# Patient Record
Sex: MALE | Race: BLACK OR AFRICAN AMERICAN | NOT HISPANIC OR LATINO | Employment: PART TIME | URBAN - METROPOLITAN AREA
[De-identification: names, ages, dates, MRNs, and addresses within clinical notes are randomized per-mention and may not be internally consistent; named-entity substitution may affect disease eponyms.]

---
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
## 2020-01-03 ENCOUNTER — HOSPITAL ENCOUNTER (OUTPATIENT)
Dept: CT IMAGING | Facility: CLINIC | Age: 20
Discharge: HOME | End: 2020-01-03
Attending: INTERNAL MEDICINE | Admitting: INTERNAL MEDICINE
Payer: COMMERCIAL

## 2020-01-03 ENCOUNTER — OFFICE VISIT (OUTPATIENT)
Dept: FAMILY MEDICINE | Facility: CLINIC | Age: 20
End: 2020-01-03
Payer: COMMERCIAL

## 2020-01-03 VITALS
DIASTOLIC BLOOD PRESSURE: 73 MMHG | TEMPERATURE: 97.2 F | BODY MASS INDEX: 18.99 KG/M2 | HEIGHT: 75 IN | HEART RATE: 68 BPM | SYSTOLIC BLOOD PRESSURE: 114 MMHG | WEIGHT: 152.7 LBS | OXYGEN SATURATION: 95 %

## 2020-01-03 DIAGNOSIS — G44.219 EPISODIC TENSION-TYPE HEADACHE, NOT INTRACTABLE: ICD-10-CM

## 2020-01-03 DIAGNOSIS — K21.9 GASTROESOPHAGEAL REFLUX DISEASE, ESOPHAGITIS PRESENCE NOT SPECIFIED: ICD-10-CM

## 2020-01-03 DIAGNOSIS — G44.219 EPISODIC TENSION-TYPE HEADACHE, NOT INTRACTABLE: Primary | ICD-10-CM

## 2020-01-03 DIAGNOSIS — G43.909 MIGRAINE WITHOUT STATUS MIGRAINOSUS, NOT INTRACTABLE, UNSPECIFIED MIGRAINE TYPE: ICD-10-CM

## 2020-01-03 PROCEDURE — 99204 OFFICE O/P NEW MOD 45 MIN: CPT | Performed by: INTERNAL MEDICINE

## 2020-01-03 PROCEDURE — 70450 CT HEAD/BRAIN W/O DYE: CPT

## 2020-01-03 RX ORDER — FAMOTIDINE 20 MG/1
20 TABLET, FILM COATED ORAL 2 TIMES DAILY
Qty: 60 TABLET | Refills: 3 | Status: SHIPPED | OUTPATIENT
Start: 2020-01-03 | End: 2020-01-16

## 2020-01-03 ASSESSMENT — ANXIETY QUESTIONNAIRES
2. NOT BEING ABLE TO STOP OR CONTROL WORRYING: SEVERAL DAYS
GAD7 TOTAL SCORE: 4
3. WORRYING TOO MUCH ABOUT DIFFERENT THINGS: SEVERAL DAYS
7. FEELING AFRAID AS IF SOMETHING AWFUL MIGHT HAPPEN: SEVERAL DAYS
1. FEELING NERVOUS, ANXIOUS, OR ON EDGE: SEVERAL DAYS
6. BECOMING EASILY ANNOYED OR IRRITABLE: NOT AT ALL
IF YOU CHECKED OFF ANY PROBLEMS ON THIS QUESTIONNAIRE, HOW DIFFICULT HAVE THESE PROBLEMS MADE IT FOR YOU TO DO YOUR WORK, TAKE CARE OF THINGS AT HOME, OR GET ALONG WITH OTHER PEOPLE: NOT DIFFICULT AT ALL
5. BEING SO RESTLESS THAT IT IS HARD TO SIT STILL: NOT AT ALL

## 2020-01-03 ASSESSMENT — MIFFLIN-ST. JEOR: SCORE: 1785.33

## 2020-01-03 ASSESSMENT — PATIENT HEALTH QUESTIONNAIRE - PHQ9: 5. POOR APPETITE OR OVEREATING: NOT AT ALL

## 2020-01-03 NOTE — PROGRESS NOTES
Chief Complaint:       Chung Kolb is a 19 year old male who presents to clinic today for the following health issues:      Chief Complaint   Patient presents with     New Patient     Headache     Pt report having flu like sx 3 weeks ago and after it went away he started to have dull pain in back of head that radiates to right eye      Gastrointestinal Problem     Pt report lack of appetite x 20 months, states that have been loosing weight in recent month.         Headaches Pain    Duration:     Since: 3 weeks ago           Specific cause: none    Description:      Location of pain: diffuse headaches     Character of pain: dull     Pain radiation: none    Intensity: moderate    History:      Pain interferes with job: yes     History of similar pain problems: no     Any previous MRI or X-rays: none     Therapies tried without relief: OTC pain medications    Alleviating factors:      Improved by: none    Precipitating factors:    Worsened by: none    Accompanying Signs & Symptoms: none            Current Medications:     Current Outpatient Medications   Medication Sig Dispense Refill     acetaminophen (TYLENOL) 325 MG tablet Take 325-650 mg by mouth every 6 hours as needed for mild pain       amoxicillin (AMOXIL) 500 MG capsule Take 1 capsule (500 mg) by mouth 3 times daily (Patient not taking: Reported on 3/6/2019) 30 capsule 0     hyoscyamine SL (LEVSIN/SL) 0.125 MG sublingual tablet Take 1 tablet (0.125 mg) by mouth 4 times daily (before meals and nightly) 20 tablet 0     ibuprofen (ADVIL,MOTRIN) 200 MG tablet Take 200 mg by mouth every 4 hours as needed for mild pain       ibuprofen (ADVIL,MOTRIN) 600 MG tablet Take 1 tablet (600 mg) by mouth every 6 hours as needed for moderate pain (Patient not taking: Reported on 3/6/2019) 30 tablet 1     ibuprofen (ADVIL/MOTRIN) 800 MG tablet Take 1 tablet (800 mg) by mouth every 8 hours as needed 60 tablet 0         Allergies:    No Known Allergies         Past Medical  History:     Past Medical History:   Diagnosis Date     GERD (gastroesophageal reflux disease)          Past Surgical History:   No past surgical history on file.      Family Medical History:     Family History   Problem Relation Age of Onset     Helicobacter Pylori Mother          Social History:     Social History     Socioeconomic History     Marital status: Single     Spouse name: Not on file     Number of children: Not on file     Years of education: Not on file     Highest education level: Not on file   Occupational History     Not on file   Social Needs     Financial resource strain: Not on file     Food insecurity:     Worry: Not on file     Inability: Not on file     Transportation needs:     Medical: Not on file     Non-medical: Not on file   Tobacco Use     Smoking status: Never Smoker     Smokeless tobacco: Never Used     Tobacco comment: non smoking home   Substance and Sexual Activity     Alcohol use: Not on file     Drug use: Not on file     Sexual activity: Not on file   Lifestyle     Physical activity:     Days per week: Not on file     Minutes per session: Not on file     Stress: Not on file   Relationships     Social connections:     Talks on phone: Not on file     Gets together: Not on file     Attends Protestant service: Not on file     Active member of club or organization: Not on file     Attends meetings of clubs or organizations: Not on file     Relationship status: Not on file     Intimate partner violence:     Fear of current or ex partner: Not on file     Emotionally abused: Not on file     Physically abused: Not on file     Forced sexual activity: Not on file   Other Topics Concern     Not on file   Social History Narrative     Not on file           Review of System:     Constitutional: Negative for fever or chills  Skin: Negative for rashes  Ears/Nose/Throat: Negative for nasal congestion, sore throat  Respiratory: No shortness of breath, dyspnea on exertion, cough, or  "hemoptysis  Cardiovascular: Negative for chest pain  Gastrointestinal: positive for GERD  Genitourinary: Negative for dysuria, hematuria  Musculoskeletal: Negative for myalgias  Neurologic: positive for headaches  Psychiatric: Negative for depression, anxiety  Hematologic/Lymphatic/Immunologic: Negative  Endocrine: Negative  Behavioral: Negative for tobacco use       Physical Exam:   /73 (BP Location: Right arm, Patient Position: Sitting, Cuff Size: Adult Regular)   Pulse 68   Temp 97.2  F (36.2  C) (Oral)   Ht 1.892 m (6' 2.5\")   Wt 69.3 kg (152 lb 11.2 oz)   SpO2 95%   BMI 19.34 kg/m      GENERAL: alert and no distress  EYES: eyes grossly normal to inspection, and conjunctivae and sclerae normal  HENT: Normocephalic atraumatic. Nose and mouth without ulcers or lesions  NECK: supple  RESP: lungs clear to auscultation   CV: regular rate and rhythm, normal S1 S2  LYMPH: no peripheral edema   ABDOMEN: nondistended  MS: no gross musculoskeletal defects noted  SKIN: no suspicious lesions or rashes  NEURO: Alert & Oriented x 3. No focal neurological deficits present  PSYCH: mentation appears normal, affect normal        Diagnostic Test Results:     Diagnostic Test Results:  CT SCAN OF THE HEAD WITHOUT CONTRAST   1/3/2020 3:30 PM      HISTORY: Headache, acute, normal neuro exam. Episodic tension-type  headache, not intractable. Migraine without status migrainosus, not  intractable, unspecified migraine type.     TECHNIQUE:  Axial images of the head and coronal reformations without  IV contrast material. Radiation dose for this scan was reduced using  automated exposure control, adjustment of the mA and/or kV according  to patient size, or iterative reconstruction technique.     COMPARISON: None.     FINDINGS: There is no evidence of intracranial hemorrhage, mass, acute  infarct or anomaly. The ventricles are normal in size, shape and  configuration. The brain parenchyma and subarachnoid spaces are  normal. "      The visualized portions of the sinuses and mastoids appear normal. The  bony calvarium and bones of the skull base appear intact.                                                                       IMPRESSION: No acute intracranial abnormality.     MILA HERNANDEZ MD    ASSESSMENT/PLAN:     (G43.909) Migraine without status migrainosus, not intractable, unspecified migraine type  (G44.219) Episodic tension-type headache, not intractable  (primary encounter diagnosis)  Comment: acute headaches for 3 weeks of unclear etiology, maybe due to migraines.   Plan:CT Head w/o Contrast to rule out intracranial abnormalities including brain tumor is unremarkable. Patient's symptoms are most likely due to migraines. I have ordered a NEUROLOGY ADULT REFERRAL for further evaluation ,       (K21.9) Gastroesophageal reflux disease, esophagitis presence not specified  Comment: chronic GERD symptoms not well controlled concerning for H. Pylori gastritis with a positive family history of h. Pylori.  Plan: GASTROENTEROLOGY ADULT REF PROCEDURE ONLY         Rodriguez Jarrettierge [phone]; No Provider        Preference, famotidine (PEPCID) 20 MG tablet    Follow Up Plan:     Patient is instructed to return to Internal Medicine clinic for follow-up visit in 1 week.        Rima Clement MD  Internal Medicine  [facility]

## 2020-01-04 ASSESSMENT — ANXIETY QUESTIONNAIRES: GAD7 TOTAL SCORE: 4

## 2020-01-16 ENCOUNTER — OFFICE VISIT (OUTPATIENT)
Dept: FAMILY MEDICINE | Facility: CLINIC | Age: 20
End: 2020-01-16
Payer: COMMERCIAL

## 2020-01-16 VITALS
SYSTOLIC BLOOD PRESSURE: 115 MMHG | OXYGEN SATURATION: 94 % | WEIGHT: 154 LBS | TEMPERATURE: 98 F | HEART RATE: 69 BPM | HEIGHT: 75 IN | DIASTOLIC BLOOD PRESSURE: 68 MMHG | BODY MASS INDEX: 19.15 KG/M2

## 2020-01-16 DIAGNOSIS — Z00.00 ROUTINE HISTORY AND PHYSICAL EXAMINATION OF ADULT: Primary | ICD-10-CM

## 2020-01-16 DIAGNOSIS — Z13.21 ENCOUNTER FOR VITAMIN DEFICIENCY SCREENING: ICD-10-CM

## 2020-01-16 DIAGNOSIS — Z13.1 SCREENING FOR DIABETES MELLITUS: ICD-10-CM

## 2020-01-16 DIAGNOSIS — Z13.29 SCREENING FOR THYROID DISORDER: ICD-10-CM

## 2020-01-16 DIAGNOSIS — E55.9 VITAMIN D DEFICIENCY: ICD-10-CM

## 2020-01-16 DIAGNOSIS — Z13.220 LIPID SCREENING: ICD-10-CM

## 2020-01-16 LAB
BASOPHILS # BLD AUTO: 0 10E9/L (ref 0–0.2)
BASOPHILS NFR BLD AUTO: 0.5 %
DIFFERENTIAL METHOD BLD: ABNORMAL
EOSINOPHIL # BLD AUTO: 0.1 10E9/L (ref 0–0.7)
EOSINOPHIL NFR BLD AUTO: 2.2 %
ERYTHROCYTE [DISTWIDTH] IN BLOOD BY AUTOMATED COUNT: 12.6 % (ref 10–15)
FOLATE SERPL-MCNC: 18.4 NG/ML
HBA1C MFR BLD: 4.8 % (ref 0–5.6)
HCT VFR BLD AUTO: 42.1 % (ref 40–53)
HGB BLD-MCNC: 14.6 G/DL (ref 13.3–17.7)
LYMPHOCYTES # BLD AUTO: 1.8 10E9/L (ref 0.8–5.3)
LYMPHOCYTES NFR BLD AUTO: 49.5 %
MCH RBC QN AUTO: 30.2 PG (ref 26.5–33)
MCHC RBC AUTO-ENTMCNC: 34.7 G/DL (ref 31.5–36.5)
MCV RBC AUTO: 87 FL (ref 78–100)
MONOCYTES # BLD AUTO: 0.4 10E9/L (ref 0–1.3)
MONOCYTES NFR BLD AUTO: 11.3 %
NEUTROPHILS # BLD AUTO: 1.4 10E9/L (ref 1.6–8.3)
NEUTROPHILS NFR BLD AUTO: 36.5 %
PLATELET # BLD AUTO: 204 10E9/L (ref 150–450)
RBC # BLD AUTO: 4.83 10E12/L (ref 4.4–5.9)
WBC # BLD AUTO: 3.7 10E9/L (ref 4–11)

## 2020-01-16 PROCEDURE — 36415 COLL VENOUS BLD VENIPUNCTURE: CPT | Performed by: INTERNAL MEDICINE

## 2020-01-16 PROCEDURE — 82746 ASSAY OF FOLIC ACID SERUM: CPT | Performed by: INTERNAL MEDICINE

## 2020-01-16 PROCEDURE — 83036 HEMOGLOBIN GLYCOSYLATED A1C: CPT | Performed by: INTERNAL MEDICINE

## 2020-01-16 PROCEDURE — 82607 VITAMIN B-12: CPT | Performed by: INTERNAL MEDICINE

## 2020-01-16 PROCEDURE — 99395 PREV VISIT EST AGE 18-39: CPT | Performed by: INTERNAL MEDICINE

## 2020-01-16 PROCEDURE — 80061 LIPID PANEL: CPT | Performed by: INTERNAL MEDICINE

## 2020-01-16 PROCEDURE — 80050 GENERAL HEALTH PANEL: CPT | Performed by: INTERNAL MEDICINE

## 2020-01-16 PROCEDURE — 82306 VITAMIN D 25 HYDROXY: CPT | Performed by: INTERNAL MEDICINE

## 2020-01-16 RX ORDER — FAMOTIDINE 20 MG/1
20 TABLET, FILM COATED ORAL 2 TIMES DAILY
COMMUNITY
End: 2021-09-15

## 2020-01-16 ASSESSMENT — MIFFLIN-ST. JEOR: SCORE: 1786.23

## 2020-01-16 NOTE — PROGRESS NOTES
SUBJECTIVE:   CC: Chung Kolb is an 20 year old male who presents for preventative health visit.     Healthy Habits:    Getting at least 3 servings of Calcium per day:  Yes    Bi-annual eye exam:  NO    Dental care twice a year:  NO    Sleep apnea or symptoms of sleep apnea:  None    Diet:  Regular (no restrictions)    Frequency of exercise:  2-3 days/week    Duration of exercise:  45-60 minutes    Taking medications regularly:  Not Applicable    Barriers to taking medications:  Not applicable    Medication side effects:  Not applicable    PHQ-2 Total Score:    Additional concerns today:  No      Today's PHQ-2 Score:   PHQ-2 ( 1999 Pfizer) 1/3/2020   Q1: Little interest or pleasure in doing things 0   Q2: Feeling down, depressed or hopeless 0   PHQ-2 Score 0       Abuse: Current or Past(Physical, Sexual or Emotional)- No  Do you feel safe in your environment? Yes        Social History     Tobacco Use     Smoking status: Never Smoker     Smokeless tobacco: Never Used     Tobacco comment: non smoking home   Substance Use Topics     Alcohol use: Not on file     If you drink alcohol do you typically have >3 drinks per day or >7 drinks per week? No      Last PSA: No results found for: PSA    Reviewed orders with patient. Reviewed health maintenance and updated orders accordingly - Yes  Lab work is in process    Reviewed and updated as needed this visit by clinical staff         Reviewed and updated as needed this visit by Provider        Past Medical History:   Diagnosis Date     GERD (gastroesophageal reflux disease)         Review of Systems  CONSTITUTIONAL: NEGATIVE for fever, chills, change in weight  INTEGUMENTARY/SKIN: NEGATIVE for worrisome rashes, moles or lesions  EYES: NEGATIVE for vision changes or irritation  ENT: NEGATIVE for ear, mouth and throat problems  RESP: NEGATIVE for significant cough or SOB  CV: NEGATIVE for chest pain, palpitations or peripheral edema  GI: NEGATIVE for nausea, abdominal pain,  "heartburn, or change in bowel habits   male: negative for dysuria, hematuria, decreased urinary stream, erectile dysfunction, urethral discharge  MUSCULOSKELETAL: NEGATIVE for significant arthralgias or myalgia  NEURO: NEGATIVE for weakness, dizziness or paresthesias  PSYCHIATRIC: NEGATIVE for changes in mood or affect    OBJECTIVE:   /68 (BP Location: Right arm, Patient Position: Sitting, Cuff Size: Adult Regular)   Pulse 69   Temp 98  F (36.7  C) (Tympanic)   Ht 1.892 m (6' 2.5\")   Wt 69.9 kg (154 lb)   SpO2 94%   BMI 19.51 kg/m      Physical Exam  GENERAL: healthy, alert and no distress  EYES: Eyes grossly normal to inspection, PERRL and conjunctivae and sclerae normal  HENT: ear canals and TM's normal, nose and mouth without ulcers or lesions  NECK: no adenopathy, no asymmetry, masses, or scars and thyroid normal to palpation  RESP: lungs clear to auscultation - no rales, rhonchi or wheezes  CV: regular rate and rhythm, normal S1 S2, no S3 or S4, no murmur, click or rub, no peripheral edema and peripheral pulses strong  ABDOMEN: soft, nontender, no hepatosplenomegaly, no masses and bowel sounds normal  MS: no gross musculoskeletal defects noted, no edema  SKIN: no suspicious lesions or rashes  NEURO: Normal strength and tone, mentation intact and speech normal  PSYCH: mentation appears normal, affect normal/bright    Diagnostic Test Results:  Labs reviewed in Epic    ASSESSMENT/PLAN:   (Z00.00) Routine history and physical examination of adult  (primary encounter diagnosis)  Comment: yearly physical exam today  Plan: CBC with platelets and differential,         Comprehensive metabolic panel (BMP + Alb, Alk         Phos, ALT, AST, Total. Bili, TP)      (Z13.220) Lipid screening  Comment: patient is due for lipid screening  Plan: Lipid panel reflex to direct LDL Fasting      (Z13.1) Screening for diabetes mellitus  Comment: patient is due for A1c lab  Plan: Hemoglobin A1c      (Z13.29) Screening for " "thyroid disorder  Comment: patient is due for thyroid disorder screening  Plan: TSH with free T4 reflex      (E55.9) Vitamin D deficiency  Comment: patient is due for vitamin D lab  Plan: Vitamin D Deficiency      (Z13.21) Encounter for vitamin deficiency screening  Comment: patient is due for vitamin deficiency screening labs  Plan: Vitamin B12, Folate      COUNSELING:   Reviewed preventive health counseling, as reflected in patient instructions  Special attention given to:        Regular exercise       Healthy diet/nutrition    Estimated body mass index is 19.34 kg/m  as calculated from the following:    Height as of 1/3/20: 1.892 m (6' 2.5\").    Weight as of 1/3/20: 69.3 kg (152 lb 11.2 oz).          reports that he has never smoked. He has never used smokeless tobacco.      Counseling Resources:  ATP IV Guidelines  Pooled Cohorts Equation Calculator  FRAX Risk Assessment  ICSI Preventive Guidelines  Dietary Guidelines for Americans, 2010  USDA's MyPlate  ASA Prophylaxis  Lung CA Screening    Rima Clement MD  [facility]  "

## 2020-01-16 NOTE — LETTER
[address]  [address]  [place]  [address]  Tel: [phone]    January 20, 2020    Chung Rioshir  [address] [place] [address]        Dear Mr. Kolb,    The results of your recent abs Ok except for low vitamin D. New prescription for vitamin D 400 international unit(s) PO once a day sent to pharmacy.    If you have any further questions or problems, please contact our office.      Sincerely,    Giuseppe Clement MD/GILES          Enclosure: Lab Results  Results for orders placed or performed in visit on 01/16/20   CBC with platelets and differential     Status: Abnormal   Result Value Ref Range    WBC 3.7 (L) 4.0 - 11.0 10e9/L    RBC Count 4.83 4.4 - 5.9 10e12/L    Hemoglobin 14.6 13.3 - 17.7 g/dL    Hematocrit 42.1 40.0 - 53.0 %    MCV 87 78 - 100 fl    MCH 30.2 26.5 - 33.0 pg    MCHC 34.7 31.5 - 36.5 g/dL    RDW 12.6 10.0 - 15.0 %    Platelet Count 204 150 - 450 10e9/L    % Neutrophils 36.5 %    % Lymphocytes 49.5 %    % Monocytes 11.3 %    % Eosinophils 2.2 %    % Basophils 0.5 %    Absolute Neutrophil 1.4 (L) 1.6 - 8.3 10e9/L    Absolute Lymphocytes 1.8 0.8 - 5.3 10e9/L    Absolute Monocytes 0.4 0.0 - 1.3 10e9/L    Absolute Eosinophils 0.1 0.0 - 0.7 10e9/L    Absolute Basophils 0.0 0.0 - 0.2 10e9/L    Diff Method Automated Method    Lipid panel reflex to direct LDL Fasting     Status: None   Result Value Ref Range    Cholesterol 118 <200 mg/dL    Triglycerides 42 <150 mg/dL    HDL Cholesterol 45 >39 mg/dL    LDL Cholesterol Calculated 65 <100 mg/dL    Non HDL Cholesterol 73 <130 mg/dL   Comprehensive metabolic panel (BMP + Alb, Alk Phos, ALT, AST, Total. Bili, TP)     Status: None   Result Value Ref Range    Sodium 138 133 - 144 mmol/L    Potassium 3.7 3.4 - 5.3 mmol/L    Chloride 106 94 - 109 mmol/L    Carbon Dioxide 29 20 - 32 mmol/L    Anion Gap 3 3 - 14 mmol/L    Glucose 75 70 - 99 mg/dL    Urea Nitrogen 12 7 - 30 mg/dL    Creatinine 0.91 0.66 - 1.25 mg/dL    GFR Estimate  >90 >60 mL/min/[1.73_m2]    GFR Estimate If Black >90 >60 mL/min/[1.73_m2]    Calcium 8.6 8.5 - 10.1 mg/dL    Bilirubin Total 0.4 0.2 - 1.3 mg/dL    Albumin 4.3 3.4 - 5.0 g/dL    Protein Total 8.1 6.8 - 8.8 g/dL    Alkaline Phosphatase 81 40 - 150 U/L    ALT 13 0 - 70 U/L    AST 12 0 - 45 U/L   Hemoglobin A1c     Status: None   Result Value Ref Range    Hemoglobin A1C 4.8 0 - 5.6 %   TSH with free T4 reflex     Status: None   Result Value Ref Range    TSH 1.63 0.40 - 4.00 mU/L   Vitamin D Deficiency     Status: Abnormal   Result Value Ref Range    Vitamin D Deficiency screening 13 (L) 20 - 75 ug/L   Vitamin B12     Status: None   Result Value Ref Range    Vitamin B12 291 193 - 986 pg/mL   Folate     Status: None   Result Value Ref Range    Folate 18.4 >5.4 ng/mL

## 2020-01-17 LAB
ALBUMIN SERPL-MCNC: 4.3 G/DL (ref 3.4–5)
ALP SERPL-CCNC: 81 U/L (ref 40–150)
ALT SERPL W P-5'-P-CCNC: 13 U/L (ref 0–70)
ANION GAP SERPL CALCULATED.3IONS-SCNC: 3 MMOL/L (ref 3–14)
AST SERPL W P-5'-P-CCNC: 12 U/L (ref 0–45)
BILIRUB SERPL-MCNC: 0.4 MG/DL (ref 0.2–1.3)
BUN SERPL-MCNC: 12 MG/DL (ref 7–30)
CALCIUM SERPL-MCNC: 8.6 MG/DL (ref 8.5–10.1)
CHLORIDE SERPL-SCNC: 106 MMOL/L (ref 94–109)
CHOLEST SERPL-MCNC: 118 MG/DL
CO2 SERPL-SCNC: 29 MMOL/L (ref 20–32)
CREAT SERPL-MCNC: 0.91 MG/DL (ref 0.66–1.25)
GFR SERPL CREATININE-BSD FRML MDRD: >90 ML/MIN/{1.73_M2}
GLUCOSE SERPL-MCNC: 75 MG/DL (ref 70–99)
HDLC SERPL-MCNC: 45 MG/DL
LDLC SERPL CALC-MCNC: 65 MG/DL
NONHDLC SERPL-MCNC: 73 MG/DL
POTASSIUM SERPL-SCNC: 3.7 MMOL/L (ref 3.4–5.3)
PROT SERPL-MCNC: 8.1 G/DL (ref 6.8–8.8)
SODIUM SERPL-SCNC: 138 MMOL/L (ref 133–144)
TRIGL SERPL-MCNC: 42 MG/DL
TSH SERPL DL<=0.005 MIU/L-ACNC: 1.63 MU/L (ref 0.4–4)
VIT B12 SERPL-MCNC: 291 PG/ML (ref 193–986)

## 2020-01-18 LAB — DEPRECATED CALCIDIOL+CALCIFEROL SERPL-MC: 13 UG/L (ref 20–75)

## 2020-01-20 RX ORDER — SWAB
1 SWAB, NON-MEDICATED MISCELLANEOUS DAILY
Qty: 90 CAPSULE | Refills: 3 | Status: SHIPPED | OUTPATIENT
Start: 2020-01-20 | End: 2022-02-16

## 2020-01-21 ENCOUNTER — OFFICE VISIT (OUTPATIENT)
Dept: URGENT CARE | Facility: URGENT CARE | Age: 20
End: 2020-01-21
Payer: COMMERCIAL

## 2020-01-21 ENCOUNTER — ANCILLARY PROCEDURE (OUTPATIENT)
Dept: GENERAL RADIOLOGY | Facility: CLINIC | Age: 20
End: 2020-01-21
Attending: INTERNAL MEDICINE
Payer: COMMERCIAL

## 2020-01-21 VITALS
BODY MASS INDEX: 19.38 KG/M2 | OXYGEN SATURATION: 100 % | DIASTOLIC BLOOD PRESSURE: 65 MMHG | WEIGHT: 153 LBS | TEMPERATURE: 98.9 F | HEART RATE: 70 BPM | SYSTOLIC BLOOD PRESSURE: 116 MMHG

## 2020-01-21 DIAGNOSIS — R07.89 CHEST TIGHTNESS: ICD-10-CM

## 2020-01-21 DIAGNOSIS — J06.9 VIRAL URI WITH COUGH: Primary | ICD-10-CM

## 2020-01-21 DIAGNOSIS — J02.9 SORE THROAT: ICD-10-CM

## 2020-01-21 DIAGNOSIS — R07.9 CHEST PAIN, UNSPECIFIED TYPE: ICD-10-CM

## 2020-01-21 LAB
DEPRECATED S PYO AG THROAT QL EIA: NORMAL
SPECIMEN SOURCE: NORMAL

## 2020-01-21 PROCEDURE — 71046 X-RAY EXAM CHEST 2 VIEWS: CPT

## 2020-01-21 PROCEDURE — 99214 OFFICE O/P EST MOD 30 MIN: CPT | Mod: 25 | Performed by: INTERNAL MEDICINE

## 2020-01-21 PROCEDURE — 87880 STREP A ASSAY W/OPTIC: CPT | Performed by: INTERNAL MEDICINE

## 2020-01-21 PROCEDURE — 93000 ELECTROCARDIOGRAM COMPLETE: CPT | Performed by: INTERNAL MEDICINE

## 2020-01-21 PROCEDURE — 87081 CULTURE SCREEN ONLY: CPT | Performed by: INTERNAL MEDICINE

## 2020-01-21 PROCEDURE — 94640 AIRWAY INHALATION TREATMENT: CPT | Performed by: INTERNAL MEDICINE

## 2020-01-21 RX ORDER — ALBUTEROL SULFATE 90 UG/1
2 AEROSOL, METERED RESPIRATORY (INHALATION) EVERY 4 HOURS PRN
Qty: 1 INHALER | Refills: 0 | Status: SHIPPED | OUTPATIENT
Start: 2020-01-21 | End: 2020-08-05

## 2020-01-21 RX ORDER — ALBUTEROL SULFATE 0.83 MG/ML
2.5 SOLUTION RESPIRATORY (INHALATION) ONCE
Status: COMPLETED | OUTPATIENT
Start: 2020-01-21 | End: 2020-01-21

## 2020-01-21 RX ADMIN — ALBUTEROL SULFATE 2.5 MG: 0.83 SOLUTION RESPIRATORY (INHALATION) at 18:31

## 2020-01-22 LAB
BACTERIA SPEC CULT: NORMAL
SPECIMEN SOURCE: NORMAL

## 2020-01-22 NOTE — PATIENT INSTRUCTIONS
Use prescribed inhaler as directed when you experience chest tightness.    Seek immediate medical attention if you develop fever, worsening shortness of breath/chest pain/phlegmy cough.

## 2020-01-24 ENCOUNTER — OFFICE VISIT (OUTPATIENT)
Dept: FAMILY MEDICINE | Facility: CLINIC | Age: 20
End: 2020-01-24
Payer: COMMERCIAL

## 2020-01-24 VITALS
TEMPERATURE: 98.9 F | OXYGEN SATURATION: 98 % | WEIGHT: 154 LBS | HEART RATE: 68 BPM | SYSTOLIC BLOOD PRESSURE: 113 MMHG | BODY MASS INDEX: 19.15 KG/M2 | DIASTOLIC BLOOD PRESSURE: 70 MMHG | HEIGHT: 75 IN

## 2020-01-24 DIAGNOSIS — Z11.3 SCREEN FOR STD (SEXUALLY TRANSMITTED DISEASE): Primary | ICD-10-CM

## 2020-01-24 PROCEDURE — 99213 OFFICE O/P EST LOW 20 MIN: CPT | Performed by: NURSE PRACTITIONER

## 2020-01-24 PROCEDURE — 87389 HIV-1 AG W/HIV-1&-2 AB AG IA: CPT | Performed by: NURSE PRACTITIONER

## 2020-01-24 PROCEDURE — 86803 HEPATITIS C AB TEST: CPT | Performed by: NURSE PRACTITIONER

## 2020-01-24 PROCEDURE — 87491 CHLMYD TRACH DNA AMP PROBE: CPT | Performed by: NURSE PRACTITIONER

## 2020-01-24 PROCEDURE — 86780 TREPONEMA PALLIDUM: CPT | Performed by: NURSE PRACTITIONER

## 2020-01-24 PROCEDURE — 36415 COLL VENOUS BLD VENIPUNCTURE: CPT | Performed by: NURSE PRACTITIONER

## 2020-01-24 PROCEDURE — 87591 N.GONORRHOEAE DNA AMP PROB: CPT | Performed by: NURSE PRACTITIONER

## 2020-01-24 ASSESSMENT — MIFFLIN-ST. JEOR: SCORE: 1786.23

## 2020-01-24 NOTE — PROGRESS NOTES
"HPI    Here for STD screen  No symptoms today   States he has never been sexually active but his mom made him come in for testing    Past Medical History:   Diagnosis Date     GERD (gastroesophageal reflux disease)      Family History   Problem Relation Age of Onset     Helicobacter Pylori Mother      No past surgical history on file.  Social History     Tobacco Use     Smoking status: Never Smoker     Smokeless tobacco: Never Used     Tobacco comment: non smoking home   Substance Use Topics     Alcohol use: Not on file     Current Outpatient Medications   Medication Sig Dispense Refill     albuterol (PROAIR HFA/PROVENTIL HFA/VENTOLIN HFA) 108 (90 Base) MCG/ACT inhaler Inhale 2 puffs into the lungs every 4 hours as needed for shortness of breath / dyspnea (chest tightness) (Patient not taking: Reported on 1/24/2020) 1 Inhaler 0     famotidine (PEPCID) 20 MG tablet Take 20 mg by mouth 2 times daily       vitamin D3 (CHOLECALCIFEROL) 10 MCG (400 UNIT) capsule Take 1 capsule (400 Units) by mouth daily (Patient not taking: Reported on 1/21/2020) 90 capsule 3     No Known Allergies    Reviewed and updated as needed this visit by clinical staff and provider     ROS  Detailed as above     /70 (BP Location: Right arm, Cuff Size: Adult Regular)   Pulse 68   Temp 98.9  F (37.2  C) (Tympanic)   Ht 1.892 m (6' 2.5\")   Wt 69.9 kg (154 lb)   SpO2 98%   BMI 19.51 kg/m       Physical Exam  NAD  Normal mood/affect    Assessment and Plan:       ICD-10-CM    1. Screen for STD (sexually transmitted disease) Z11.3 NEISSERIA GONORRHOEA PCR     CHLAMYDIA TRACHOMATIS PCR     Hepatitis C antibody     HIV Antigen Antibody Combo     Treponema Abs w Reflex to RPR and Titer         Matthew Rios, APRN, CNP  [facility]      "

## 2020-01-26 LAB
C TRACH DNA SPEC QL NAA+PROBE: NEGATIVE
HCV AB SERPL QL IA: NONREACTIVE
HIV 1+2 AB+HIV1 P24 AG SERPL QL IA: NONREACTIVE
N GONORRHOEA DNA SPEC QL NAA+PROBE: NEGATIVE
SPECIMEN SOURCE: NORMAL
SPECIMEN SOURCE: NORMAL
T PALLIDUM AB SER QL: NONREACTIVE

## 2020-01-30 NOTE — PROGRESS NOTES
"  3 days prior to consultation, the patient developed nasal congestion which has persisted and is now associated with sore throat, occasional cough and chest \"tightness\".  Denies history of asthma.      Past Medical History:   Diagnosis Date     GERD (gastroesophageal reflux disease)        Review of Systems   Constitutional: Positive for fatigue. Negative for fever.   HENT: Positive for congestion and sore throat. Negative for ear pain, sinus pressure, sinus pain and trouble swallowing.    Respiratory: Positive for cough and chest tightness. Negative for shortness of breath and wheezing.    Cardiovascular: Negative for chest pain.   Gastrointestinal: Negative for abdominal pain, diarrhea, nausea and vomiting.   Neurological: Negative for syncope and light-headedness.       /65 (BP Location: Left arm, Patient Position: Sitting, Cuff Size: Adult Regular)   Pulse 70   Temp 98.9  F (37.2  C) (Oral)   Wt 69.4 kg (153 lb)   SpO2 100%   BMI 19.38 kg/m      Physical Exam  Constitutional:       General: He is not in acute distress.     Appearance: He is not ill-appearing.   HENT:      Right Ear: Tympanic membrane normal.      Left Ear: Tympanic membrane normal.      Nose: Congestion present.      Mouth/Throat:      Pharynx: No oropharyngeal exudate or posterior oropharyngeal erythema.   Cardiovascular:      Rate and Rhythm: Normal rate and regular rhythm.      Heart sounds: Normal heart sounds.   Pulmonary:      Effort: Pulmonary effort is normal. No respiratory distress.      Breath sounds: Normal breath sounds. No wheezing.   Lymphadenopathy:      Cervical: No cervical adenopathy.   Skin:     Findings: No rash.   Neurological:      Mental Status: He is alert and oriented to person, place, and time.           ICD-10-CM    1. Viral URI with cough J06.9     B97.89    2. Chest tightness R07.89 EKG 12-lead complete w/read - Clinics     albuterol (PROVENTIL) neb solution 2.5 mg     XR Chest 2 Views     albuterol " (PROAIR HFA/PROVENTIL HFA/VENTOLIN HFA) 108 (90 Base) MCG/ACT inhaler   3. Sore throat J02.9 Rapid strep screen     Beta strep group A culture       Patient Instructions   Use prescribed inhaler as directed when you experience chest tightness.    Seek immediate medical attention if you develop fever, worsening shortness of breath/chest pain/phlegmy cough.

## 2020-02-19 ASSESSMENT — ENCOUNTER SYMPTOMS
FATIGUE: 1
TROUBLE SWALLOWING: 0
LIGHT-HEADEDNESS: 0
SORE THROAT: 1
VOMITING: 0
CHEST TIGHTNESS: 1
DIARRHEA: 0
WHEEZING: 0
SHORTNESS OF BREATH: 0
FEVER: 0
COUGH: 1
NAUSEA: 0
SINUS PRESSURE: 0
SINUS PAIN: 0
ABDOMINAL PAIN: 0

## 2020-08-04 ENCOUNTER — TELEPHONE (OUTPATIENT)
Dept: FAMILY MEDICINE | Facility: CLINIC | Age: 20
End: 2020-08-04

## 2020-08-04 NOTE — TELEPHONE ENCOUNTER
"Spoke with pt    He reports SOB when going to sleep or waking up  Now states he feels SOB during the day  Had \"pneumonia patch\" back in January  Denies cough or fever  Every few weeks he feels a little chest pain on his right side - he thinks it could be related to working out    Scheduled with team Thursday    Jose FOX RN  "

## 2020-08-04 NOTE — TELEPHONE ENCOUNTER
Reason for call:  Patient reporting a symptom    Symptom or request: SOB, occasional chest pain.     Duration (how long have symptoms been present): off and on since January    Have you been treated for this before? Yes    Additional comments:   Pt is calling because he has been experiencing a SOB.  This has been since January.  He took antibiotics and that cleared it up.  Now he is having SOB.  No coughing, no fever, but there is occasional chest pain it is a 3-4/10.  The SOB comes and goes.  He does use an inhaler it does help but the SOB is worsening.      Phone Number patient can be reached at:  Home number on file [phone]    Best Time:  Any     Can we leave a detailed message on this number:  YES    Call taken on 8/4/2020 at 1:24 PM by Genesis Beaver

## 2020-08-06 ENCOUNTER — TELEPHONE (OUTPATIENT)
Dept: FAMILY MEDICINE | Facility: CLINIC | Age: 20
End: 2020-08-06

## 2020-08-06 ENCOUNTER — OFFICE VISIT (OUTPATIENT)
Dept: FAMILY MEDICINE | Facility: CLINIC | Age: 20
End: 2020-08-06
Payer: COMMERCIAL

## 2020-08-06 ENCOUNTER — ANCILLARY PROCEDURE (OUTPATIENT)
Dept: GENERAL RADIOLOGY | Facility: CLINIC | Age: 20
End: 2020-08-06
Attending: INTERNAL MEDICINE
Payer: COMMERCIAL

## 2020-08-06 VITALS
BODY MASS INDEX: 18.65 KG/M2 | SYSTOLIC BLOOD PRESSURE: 111 MMHG | DIASTOLIC BLOOD PRESSURE: 71 MMHG | HEIGHT: 75 IN | TEMPERATURE: 98.4 F | WEIGHT: 150 LBS | OXYGEN SATURATION: 100 % | HEART RATE: 71 BPM

## 2020-08-06 DIAGNOSIS — R06.2 WHEEZING: Primary | ICD-10-CM

## 2020-08-06 DIAGNOSIS — R06.00 DYSPNEA, UNSPECIFIED TYPE: Primary | ICD-10-CM

## 2020-08-06 DIAGNOSIS — R06.2 WHEEZING: ICD-10-CM

## 2020-08-06 DIAGNOSIS — R06.00 DYSPNEA, UNSPECIFIED TYPE: ICD-10-CM

## 2020-08-06 PROCEDURE — 99214 OFFICE O/P EST MOD 30 MIN: CPT | Performed by: INTERNAL MEDICINE

## 2020-08-06 PROCEDURE — 71046 X-RAY EXAM CHEST 2 VIEWS: CPT

## 2020-08-06 RX ORDER — FLUTICASONE PROPIONATE 110 UG/1
1 AEROSOL, METERED RESPIRATORY (INHALATION) 2 TIMES DAILY
Qty: 1 INHALER | Refills: 0 | Status: SHIPPED | OUTPATIENT
Start: 2020-08-06 | End: 2021-09-15

## 2020-08-06 ASSESSMENT — MIFFLIN-ST. JEOR: SCORE: 1768.09

## 2020-08-06 NOTE — TELEPHONE ENCOUNTER
Routing refill request to provider for review/approval because:  Drug not active on patient's medication list    RX and pharmacy pended.     Thank you,  Silvana TORRES RN,BSN

## 2020-08-06 NOTE — PATIENT INSTRUCTIONS
I suspect that you may have a little bit of reactive airway disease.  This may relate to previous infection.  Sometimes after infection the airway can be more irritated.  This can last for up to 6 to 12 months.  I would recommend a trial of a medication to help soothe the lungs.  I recommend that you take it for the next 2 to 3 weeks and return to clinic in 1 month to assess how you are doing from a breathing standpoint.    I have ordered pulmonary function test to be performed to give us an idea of how well your lungs are currently working.    I have will review your chest x-ray to be certain that there is no indication of an underlying pneumonia or other problem.

## 2020-08-06 NOTE — TELEPHONE ENCOUNTER
Reason for Call:  Medication or medication refill:    Do you use a [facility]?  Name of the pharmacy and phone number for the current request:     [id number] - [place], [place] - [address]    Name of the medication requested: aero chamber/spacer for flovent inhaler    Other request: Pt states one did not come with Rx as previously indicated     Can we leave a detailed message on this number? YES    Phone number patient can be reached at: Cell number on file:    Telephone Information:   Mobile [phone]     Best Time: 12:30 or later    Call taken on 8/6/2020 at 10:41 AM by Nell Sparrow

## 2020-08-06 NOTE — PROGRESS NOTES
"Subjective     Chung Kolb is a 20 year old male who presents to clinic today for the following health issues:    HPI patient presents clinic today with issues of dyspnea and shortness of breath.  Patient had some chest pressure and discomfort dating back to January.  Chest x-ray at that time was viewed and is negative.  He states that around January and later namely the late spring he started to have sensations of shortness of breath before he went to bed.  States that sometimes he has a sensation he cannot take in a deep breath.  He was given albuterol for his symptoms and claims that that has helped to some degree.  Patient claims occasional wheezing.  Does have a family history of asthma with his brother afflicted.    Patient states that he has wheezing and occasional shortness of breath after exercise.  When he is actually exercising he has no significant symptoms.  No other stitch will symptoms no fevers.  No significant cough.      He reports SOB when going to sleep or waking up  Now states he feels SOB during the day  Had \"pneumonia patch\" back in January  Denies cough or fever  Every few weeks he feels a little chest pain on his right side - he thinks it could be related to working out.    Patient states he also does have some anxiety. Denies depression.           There is no problem list on file for this patient.    No past surgical history on file.    Social History     Tobacco Use     Smoking status: Never Smoker     Smokeless tobacco: Never Used     Tobacco comment: non smoking home   Substance Use Topics     Alcohol use: Not on file     Family History   Problem Relation Age of Onset     Helicobacter Pylori Mother            Reviewed and updated as needed this visit by Provider         Review of Systems   Constitutional, HEENT, cardiovascular, pulmonary, gi and gu systems are negative, except as otherwise noted.      Objective    There were no vitals taken for this visit.  There is no height or " weight on file to calculate BMI.  Physical Exam     Alert slightly anxious appearing 20-year-old male.  Lung fields are clear no wheezes are noted no murmurs rubs or gallops heart regular rate and rhythm  Extremities are warm and nonedematous patient is not in any respiratory distress his O2 saturations are 100% on room air.  Diagnostic Test Results:  Labs reviewed in Epic        Assessment & Plan     Chung was seen today for shortness of breath.    Diagnoses and all orders for this visit:    Dyspnea, unspecified type  -     XR Chest 2 Views; Future  -     General PFT Lab (Please always keep checked); Future  -     Pulmonary Function Test; Future  -     fluticasone (FLOVENT HFA) 110 MCG/ACT inhaler; Inhale 1 puff into the lungs 2 times daily    Wheezing  -     AEROCHAMBER           Patient with signs and symptoms somewhat consistent with reactive airways disease or asthma.  There is a component of stress I suspect as well, nevertheless we will obtain a chest x-ray again today to look at his lung fields and I would recommend pulmonary function testing to rule out any issues of obstruction of airway.  In the meanwhile, the patient does give a history consistent with underlying inflammation with periodic wheezing.  This together with his daily symptoms bring to mind initiating low dose of a inhaled glucocorticoid while we are figuring out the rest of the story.    No fever or cough less concerning for viral etiology.  More consistent with underlying allergic.  Follow-up in 1 month  Jelani Lau MD  [facility]

## 2020-11-29 ENCOUNTER — HEALTH MAINTENANCE LETTER (OUTPATIENT)
Age: 20
End: 2020-11-29

## 2021-04-10 ENCOUNTER — HEALTH MAINTENANCE LETTER (OUTPATIENT)
Age: 21
End: 2021-04-10

## 2021-07-05 ENCOUNTER — NURSE TRIAGE (OUTPATIENT)
Dept: NURSING | Facility: CLINIC | Age: 21
End: 2021-07-05

## 2021-07-05 ENCOUNTER — HOSPITAL ENCOUNTER (EMERGENCY)
Facility: CLINIC | Age: 21
Discharge: HOME | End: 2021-07-05
Attending: EMERGENCY MEDICINE | Admitting: EMERGENCY MEDICINE
Payer: COMMERCIAL

## 2021-07-05 VITALS
DIASTOLIC BLOOD PRESSURE: 57 MMHG | HEART RATE: 77 BPM | OXYGEN SATURATION: 98 % | SYSTOLIC BLOOD PRESSURE: 123 MMHG | RESPIRATION RATE: 18 BRPM | TEMPERATURE: 97.8 F

## 2021-07-05 DIAGNOSIS — F43.0 ACUTE REACTION TO STRESS: ICD-10-CM

## 2021-07-05 LAB
LABORATORY COMMENT REPORT: NORMAL
SARS-COV-2 RNA RESP QL NAA+PROBE: NEGATIVE
SPECIMEN SOURCE: NORMAL

## 2021-07-05 PROCEDURE — 90791 PSYCH DIAGNOSTIC EVALUATION: CPT

## 2021-07-05 PROCEDURE — 87635 SARS-COV-2 COVID-19 AMP PRB: CPT | Performed by: EMERGENCY MEDICINE

## 2021-07-05 PROCEDURE — 99285 EMERGENCY DEPT VISIT HI MDM: CPT | Mod: 25

## 2021-07-05 PROCEDURE — C9803 HOPD COVID-19 SPEC COLLECT: HCPCS

## 2021-07-05 RX ORDER — HYDROXYZINE HYDROCHLORIDE 25 MG/1
25-50 TABLET, FILM COATED ORAL 3 TIMES DAILY PRN
Qty: 30 TABLET | Refills: 0 | Status: SHIPPED | OUTPATIENT
Start: 2021-07-05 | End: 2021-09-15

## 2021-07-05 ASSESSMENT — ENCOUNTER SYMPTOMS
APPETITE CHANGE: 0
NERVOUS/ANXIOUS: 1
ABDOMINAL PAIN: 0
NAUSEA: 1
FATIGUE: 1
SLEEP DISTURBANCE: 1

## 2021-07-05 NOTE — ED TRIAGE NOTES
Traumatic Life event happened last Wednesday. Has been unable to eat and sleep, very anxious. Shakiness also noted.

## 2021-07-05 NOTE — TELEPHONE ENCOUNTER
"Pt states on 6/30 he had a \"traumatic event\" and since then he has been unable to sleep well. Even w/ zolpidem only sleeping 2-3 hrs a night and waking multiple times. Also states he has been unable to eat. Has gone to work and able to do his basic daily activities but \"it's a struggle\". Does not work w/ a  professional at all. Advised see provider w/i 24 hrs.     Reason for Disposition    Depression is suspected    [1] Depression AND [2] worsening (e.g.,sleeping poorly, less able to do activities of daily living)    Additional Information    Negative: Patient attempted suicide    Negative: Patient is threatening suicide now    Negative: Violent behavior, or threatening to physically hurt or kill someone    Negative: [1] Patient is very confused (disoriented, slurred speech) AND [2] no other adult (e.g., friend or family member) available    Negative: [1] Difficult to awaken or acting very confused (disoriented, slurred speech) AND [2] new onset    Negative: Sounds like a life-threatening emergency to the triager    Negative: Alcohol use, abuse or dependence, question or problem related to    Negative: Drug abuse or dependence, question or problem related to    Negative: Bipolar disorder (manic depression)    Negative: Depression during the postpartum period (< 1 year since delivery)    Negative: [1] Depression AND [2] unable to do any of normal activities (e.g., self care, school, work; in comparison to baseline).    Negative: Very strange or confused behavior    Negative: Patient sounds very sick or weak to the triager    Protocols used: INSOMNIA-A-AH, DEPRESSION-A-AH      "

## 2021-07-06 NOTE — ED PROVIDER NOTES
"  History   Chief Complaint:  Insomnia and Anxiety     HPI   Chung Kolb is a 21 year old male who presents with insomnia and anxiety. Five days ago, he witnessed his younger brother have a \"mental break\" requiring hospitalization. Since then, he has had trouble sleeping and wakes up in the middle of the night feeling anxious and shaky. He has treated with zolpidem, which typically uses as needed, with minimal relief. Despite zolpidem he has not been able to sleep. He notes decreased appetite. He has tried eating but every time he has attempted to eat he has felt nauseous. He notes that he has a history of anxiety, but that this episode is much more severe and he is feeling fatigued and weak due to his symptoms. He denies abdominal pain. He denies tobacco or alcohol or drug use. He denies any other symptoms.     Review of Systems   Constitutional: Positive for fatigue. Negative for appetite change.   Gastrointestinal: Positive for nausea. Negative for abdominal pain.   Psychiatric/Behavioral: Positive for sleep disturbance. The patient is nervous/anxious.    All other systems reviewed and are negative.      Allergies:  The patient has no known allergies.     Medications:  Albuterol inhaler   Pepcid  Flovent   Cholecalciferol     Past Medical History:    Allergic rhinitis   GERD     Family History:    Amblyopia  Cataract  H. Pylori     Social History:  Accompanied by mother  Denies tobacco and alcohol use  Works in security   Is in school     Physical Exam     Patient Vitals for the past 24 hrs:   BP Temp Temp src Pulse Resp SpO2   07/05/21 1846 123/57 97.8  F (36.6  C) Temporal 77 18 98 %       Physical Exam  General: Well appearing, nontoxic. Resting comfortably  Head:  Scalp, face, and head appear normal  Eyes:  Pupils are equal, round, reactive to light     Conjunctivae non-injected and sclerae white  ENT:    The external nose is normal    Pinnae are normal  Neck:  Normal range of motion    There is no " rigidity noted    Trachea is in the midline  CV:  Regular rate and rhythm     Normal S1/S2, no S3/S4    No murmur or rub. Radial pulses 2+ bilaterally.  Resp:  Lungs are clear and equal bilaterally  There is no tachypnea    No increased work of breathing    No rales, wheezing, or rhonchi  GI:  Abdomen is soft, no rigidity or guarding    No distension, or mass    No tenderness or rebound tenderness   MS:  Normal muscular tone    Symmetric motor strength    No lower extremity edema  Skin:  No rash or acute skin lesions noted  Neuro: Awake and alert  Speech is normal and fluent  Moves all extremities spontaneously  Psych:  Normal affect. Appropriate interactions. Endorses anxiety. Denies depressed mood, SI. No evidence of psychosis, response to internal stimuli.  He denies hallucinations.      Emergency Department Course   Laboratory:  Asymptomatic COVID19 Virus PCR by nasopharyngeal swab pending     Procedures  None    Emergency Department Course:  Reviewed:  I reviewed nursing notes, vitals, past medical history and care everywhere    Assessments:  1950 I obtained history and examined the patient as noted above.   2101 I rechecked the patient.       Consults:   None    Interventions:  None    Disposition:  The patient was discharged to home.     Impression & Plan     Medical Decision Making:  Chung Kolb is a 21 year old male who presents with ongoing insomnia and anxiety related to acute stress of recently witnessing his brother go through a mental health crisis.  On my evaluation he is well-appearing, hemodynamically stable and afebrile.  No agitation.  Patient is calm cooperative and has good insight into his current symptoms.  No evidence of psychosis.  Patient denies any suicidal or homicidal ideation.  He denies significant depressive symptoms but has most trouble with anxiety and insomnia.  He denies any physical health concerns.  He denies any alcohol or drug use.  No indication for any emergent testing  at this time.  I discussed with him that he should continue using his Ambien and may use hydroxyzine throughout the day or at night to help with anxiety and sleep.  I discussed the importance of counseling and therapy to help process what happened with his brother as well as consideration of additional medication treatment for his underlying anxiety disorder.  Patient will need close follow-up.  No indication for psychiatric hold at this time.  Patient was evaluated by [facility] and resources for outpatient mental health follow up were provided. He was discharged in stable condition.       Covid-19  Chung Kolb was evaluated during a global COVID-19 pandemic, which necessitated consideration that the patient might be at risk for infection with the SARS-CoV-2 virus that causes COVID-19.   Applicable protocols for evaluation were followed during the patient's care.   COVID-19 was considered as part of the patient's evaluation. The plan for testing is:  a test was obtained during this visit.    Diagnosis:    ICD-10-CM    1. Acute reaction to stress  F43.0 Asymptomatic SARS-CoV-2 COVID-19 Virus (Coronavirus) by PCR       Discharge Medications:  New Prescriptions    HYDROXYZINE (ATARAX) 25 MG TABLET    Take 1-2 tablets (25-50 mg) by mouth 3 times daily as needed for anxiety or other (sleep)       Scribe Disclosure:  I, Lidia So, am serving as a scribe at 7:43 PM on 7/5/2021 to document services personally performed by Rudolph Aponte MD based on my observations and the provider's statements to me.              Rudolph Aponte MD  07/05/21 6656       Rudolph Aponte MD  07/05/21 6823

## 2021-07-06 NOTE — DISCHARGE INSTRUCTIONS
Discharge Instructions  Mental Health Concerns    You were seen today for mental health concerns, such as depression, anxiety, or suicidal thinking. Your provider feels that you do not require hospitalization at this time. However, your symptoms may become worse, and you may need to return to the Emergency Department. Most treatments of depression and suicidal thoughts are a process rather than a single intervention.  Medications and counseling can take several weeks or more to help.    Generally, every Emergency Department visit should have a follow-up clinic visit with either a primary or a specialty clinic/provider. Please follow-up as instructed by your emergency provider today.    By accepting these discharge instructions:  You promise to not harm yourself or others.  You agree that if you feel you are becoming unable to keep that promise, you will do something to help yourself before you do anything to harm yourself or others.   You agree to keep any safety plan arranged on your visit here today.  You agree to take any medication prescribed or recommended by your provider.  If you are getting worse, you can contact a friend or a family member, contact your counselor or family provider, contact a crisis line, or other options discussed with the provider or therapist today.  At any time, you can call 911 and return to the Emergency Department for more help.  You understand that follow-up is essential to your treatment, and you will make and keep appointments recommended on your visit today.    How to improve your mental health and prevent suicide:  Involve others by letting family, friends, counselors know.  Do not isolate yourself.  Avoid alcohol or drugs. Remove weapons, poisons from your home.  Try to stick to routines for eating, sleeping and getting regular exercise.    Try to get into sunlight. Bright natural light not only treats seasonal affective disorder but also depression.  Increase safe activities  that you enjoy.    If you feel worse, contact 1-800-suicide (1-759.856.5247), or call 911, or your primary provider/counselor for additional assistance.    If you were given a prescription for medicine here today, be sure to read all of the information (including the package insert) that comes with your prescription.  This will include important information about the medicine, its side effects, and any warnings that you need to know about.  The pharmacist who fills the prescription can provide more information and answer questions you may have about the medicine.  If you have questions or concerns that the pharmacist cannot address, please call or return to the Emergency Department.   Remember that you can always come back to the Emergency Department if you are not able to see your regular provider in the amount of time listed above, if you get any new symptoms, or if there is anything that worries you.

## 2021-07-06 NOTE — ED NOTES
Pt reports he had a traumatic event happen on Wednesday. He has been unable to sleep. Pt has tried ambien 5mg for the past 3 days and its not helping. Pt feels shaky and anxious. Pt denies feeling suicidal or homicidal

## 2021-07-06 NOTE — ED NOTES
Spoke with pt and family member regarding transfer to [facility] unit. Pt was not interested in transfer to [facility]. He did not want to spend the night and wanted to stay with family members. Pt was agreeable to being assessed in ED as pt is hoping to get resources for mental health therapy.     Pt and family had many questions about new medication, hydroxyzine that was prescribed. New medication education was provided and all questions were answered. Pt was able to develop a safety plan, denied any SI or HI. Pt reports that he would be going home with support from his family and does not live alone. No safety concerns noted at this time.

## 2021-09-14 ASSESSMENT — ENCOUNTER SYMPTOMS
WEAKNESS: 0
HEMATOCHEZIA: 0
JOINT SWELLING: 0
MYALGIAS: 0
DIZZINESS: 1
SHORTNESS OF BREATH: 0
NAUSEA: 0
ARTHRALGIAS: 0
HEMATURIA: 0
COUGH: 0
ABDOMINAL PAIN: 0
DIARRHEA: 0
CHILLS: 0
SORE THROAT: 0
FREQUENCY: 0
FEVER: 0
CONSTIPATION: 0
HEADACHES: 0
DYSURIA: 0
NERVOUS/ANXIOUS: 0
PALPITATIONS: 0
EYE PAIN: 0
PARESTHESIAS: 0
HEARTBURN: 0

## 2021-09-15 ENCOUNTER — OFFICE VISIT (OUTPATIENT)
Dept: FAMILY MEDICINE | Facility: CLINIC | Age: 21
End: 2021-09-15
Payer: COMMERCIAL

## 2021-09-15 VITALS
OXYGEN SATURATION: 97 % | TEMPERATURE: 96.9 F | HEIGHT: 74 IN | SYSTOLIC BLOOD PRESSURE: 120 MMHG | BODY MASS INDEX: 22.97 KG/M2 | WEIGHT: 179 LBS | RESPIRATION RATE: 12 BRPM | DIASTOLIC BLOOD PRESSURE: 73 MMHG | HEART RATE: 77 BPM

## 2021-09-15 DIAGNOSIS — Z00.00 ROUTINE HISTORY AND PHYSICAL EXAMINATION OF ADULT: Primary | ICD-10-CM

## 2021-09-15 PROCEDURE — 99395 PREV VISIT EST AGE 18-39: CPT | Performed by: INTERNAL MEDICINE

## 2021-09-15 RX ORDER — HYDROXYZINE HYDROCHLORIDE 25 MG/1
25-50 TABLET, FILM COATED ORAL 3 TIMES DAILY PRN
Qty: 30 TABLET | Refills: 0 | Status: SHIPPED | OUTPATIENT
Start: 2021-09-15 | End: 2022-04-12

## 2021-09-15 ASSESSMENT — MIFFLIN-ST. JEOR: SCORE: 1881.93

## 2021-09-15 NOTE — PROGRESS NOTES
SUBJECTIVE:   CC: Chung Kolb is an 21 year old male who presents for preventative health visit.     Patient has been advised of split billing requirements and indicates understanding: Yes  Healthy Habits:     Getting at least 3 servings of Calcium per day:  Yes    Bi-annual eye exam:  NO    Dental care twice a year:  NO    Sleep apnea or symptoms of sleep apnea:  None    Diet:  Regular (no restrictions)    Frequency of exercise:  2-3 days/week    Duration of exercise:  Other    Taking medications regularly:  No    Barriers to taking medications:  None    Medication side effects:  Not applicable    PHQ-2 Total Score: 0    Additional concerns today:  Yes    Ability to successfully perform activities of daily living: Yes, no assistance needed  Home safety:  none identified   Hearing impairment: none      Today's PHQ-2 Score:   PHQ-2 ( 1999 Pfizer) 9/14/2021   Q1: Little interest or pleasure in doing things 0   Q2: Feeling down, depressed or hopeless 0   PHQ-2 Score 0   Q1: Little interest or pleasure in doing things Not at all   Q2: Feeling down, depressed or hopeless Not at all   PHQ-2 Score 0       Abuse: Current or Past(Physical, Sexual or Emotional)- No  Do you feel safe in your environment? Yes    Have you ever done Advance Care Planning? (For example, a Health Directive, POLST, or a discussion with a medical provider or your loved ones about your wishes): No, advance care planning information given to patient to review.  Patient plans to discuss their wishes with loved ones or provider.      Social History     Tobacco Use     Smoking status: Never Smoker     Smokeless tobacco: Never Used     Tobacco comment: non smoking home   Substance Use Topics     Alcohol use: Never     Alcohol/week: 0.0 standard drinks     If you drink alcohol do you typically have >3 drinks per day or >7 drinks per week? No    Alcohol Use 9/14/2021   Prescreen: >3 drinks/day or >7 drinks/week? Not Applicable   Prescreen: >3 drinks/day  "or >7 drinks/week? -   No flowsheet data found.    Last PSA: No results found for: PSA    Reviewed orders with patient. Reviewed health maintenance and updated orders accordingly - Yes  Labs reviewed in EPIC    Reviewed and updated as needed this visit by clinical staff                 Reviewed and updated as needed this visit by Provider                Past Medical History:   Diagnosis Date     GERD (gastroesophageal reflux disease)      Uncomplicated asthma 8-2020        Review of Systems  CONSTITUTIONAL: NEGATIVE for fever, chills, change in weight  INTEGUMENTARY/SKIN: NEGATIVE for worrisome rashes, moles or lesions  EYES: NEGATIVE for vision changes or irritation  ENT: NEGATIVE for ear, mouth and throat problems  RESP: NEGATIVE for significant cough or SOB  CV: NEGATIVE for chest pain, palpitations or peripheral edema  GI: NEGATIVE for nausea, abdominal pain, heartburn, or change in bowel habits   male: negative for dysuria, hematuria, decreased urinary stream, erectile dysfunction, urethral discharge  MUSCULOSKELETAL: NEGATIVE for significant arthralgias or myalgia  NEURO: NEGATIVE for weakness, dizziness or paresthesias  PSYCHIATRIC: NEGATIVE for changes in mood or affect    OBJECTIVE:   /73 (BP Location: Right arm, Patient Position: Sitting, Cuff Size: Adult Regular)   Pulse 77   Temp 96.9  F (36.1  C) (Temporal)   Resp 12   Ht 1.872 m (6' 1.7\")   Wt 81.2 kg (179 lb)   SpO2 97%   BMI 23.17 kg/m      Physical Exam  GENERAL: healthy, alert and no distress  EYES: Eyes grossly normal to inspection, PERRL and conjunctivae and sclerae normal  HENT: ear canals and TM's normal, nose and mouth without ulcers or lesions  NECK: no adenopathy, no asymmetry, masses, or scars and thyroid normal to palpation  RESP: lungs clear to auscultation - no rales, rhonchi or wheezes  CV: regular rate and rhythm, normal S1 S2, no S3 or S4, no murmur, click or rub, no peripheral edema and peripheral pulses " "strong  ABDOMEN: soft, nontender, no hepatosplenomegaly, no masses and bowel sounds normal  MS: no gross musculoskeletal defects noted, no edema  SKIN: no suspicious lesions or rashes  NEURO: Normal strength and tone, mentation intact and speech normal  PSYCH: mentation appears normal, affect normal/bright    Diagnostic Test Results:  Labs reviewed in Epic    ASSESSMENT/PLAN:   Chung was seen today for physical.    Diagnoses and all orders for this visit:    Routine history and physical examination of adult  -     hydrOXYzine (ATARAX) 25 MG tablet; Take 1-2 tablets (25-50 mg) by mouth 3 times daily as needed for anxiety or other (sleep)  -     Hemoglobin; Future  -     Vitamin D Deficiency; Future  -     Cortisol; Future        Patient has been advised of split billing requirements and indicates understanding: Yes  COUNSELING:   Reviewed preventive health counseling, as reflected in patient instructions  Special attention given to:        Regular exercise       Healthy diet/nutrition    Estimated body mass index is 19 kg/m  as calculated from the following:    Height as of 8/6/20: 1.892 m (6' 2.5\").    Weight as of 8/6/20: 68 kg (150 lb).         He reports that he has never smoked. He has never used smokeless tobacco.      Counseling Resources:  ATP IV Guidelines  Pooled Cohorts Equation Calculator  FRAX Risk Assessment  ICSI Preventive Guidelines  Dietary Guidelines for Americans, 2010  USDA's MyPlate  ASA Prophylaxis  Lung CA Screening    Rima Clement MD  [facility]  "

## 2021-09-25 ENCOUNTER — HEALTH MAINTENANCE LETTER (OUTPATIENT)
Age: 21
End: 2021-09-25

## 2022-02-03 ENCOUNTER — OFFICE VISIT (OUTPATIENT)
Dept: FAMILY MEDICINE | Facility: CLINIC | Age: 22
End: 2022-02-03
Payer: COMMERCIAL

## 2022-02-03 VITALS
WEIGHT: 179.5 LBS | RESPIRATION RATE: 16 BRPM | SYSTOLIC BLOOD PRESSURE: 120 MMHG | TEMPERATURE: 97.5 F | OXYGEN SATURATION: 98 % | DIASTOLIC BLOOD PRESSURE: 66 MMHG | HEART RATE: 73 BPM | BODY MASS INDEX: 23.23 KG/M2

## 2022-02-03 DIAGNOSIS — R20.9 COLD FEET: Primary | ICD-10-CM

## 2022-02-03 DIAGNOSIS — J45.20 MILD INTERMITTENT ASTHMA WITHOUT COMPLICATION: ICD-10-CM

## 2022-02-03 DIAGNOSIS — E55.9 VITAMIN D DEFICIENCY: ICD-10-CM

## 2022-02-03 LAB — DEPRECATED CALCIDIOL+CALCIFEROL SERPL-MC: 11 UG/L (ref 20–75)

## 2022-02-03 PROCEDURE — 82306 VITAMIN D 25 HYDROXY: CPT | Performed by: PHYSICIAN ASSISTANT

## 2022-02-03 PROCEDURE — 99213 OFFICE O/P EST LOW 20 MIN: CPT | Performed by: PHYSICIAN ASSISTANT

## 2022-02-03 PROCEDURE — 84443 ASSAY THYROID STIM HORMONE: CPT | Performed by: PHYSICIAN ASSISTANT

## 2022-02-03 PROCEDURE — 36415 COLL VENOUS BLD VENIPUNCTURE: CPT | Performed by: PHYSICIAN ASSISTANT

## 2022-02-03 RX ORDER — ALBUTEROL SULFATE 90 UG/1
2 AEROSOL, METERED RESPIRATORY (INHALATION) EVERY 4 HOURS PRN
Qty: 18 G | Refills: 3 | Status: SHIPPED | OUTPATIENT
Start: 2022-02-03

## 2022-02-03 ASSESSMENT — PAIN SCALES - GENERAL: PAINLEVEL: NO PAIN (0)

## 2022-02-03 NOTE — PROGRESS NOTES
Assessment and Plan:     (R20.9) Cold feet  (primary encounter diagnosis)  Comment: only happens at night, adamant that he have thyroid tested though no other symptoms, exam benign  Plan: TSH with free T4 reflex  rec warm socks at bedtime    (J45.20) Mild intermittent asthma without complication  Comment: rarely uses albuterol mdi  Plan: albuterol (PROAIR HFA/PROVENTIL HFA/VENTOLIN         HFA) 108 (90 Base) MCG/ACT inhaler    (E55.9) Vitamin D deficiency  Comment: last level 13, on vitamin D  Plan: Vitamin D Deficiency    He declined the covid vaccine but knows he can call and get one in the future if he changes his mind    Leighann Andre PA-C          Star Wilkes is a 22 year old who presents for the following health issues     HPI     He has noticed that his feet get cold at night x last few months  It only happens at night  During the day he does not have any issues  He would like to have his thyroid checked today  He denies any other cold intolerance, changes in skin or hair, lower extremity swelling, neck swelling   He is unsure if he has a family history of thyroids issues   Would also like vitamin D level checked today  Due for albuterol mdi refill, uses very intermittently, last use was last summer    Review of Systems   See above      Objective    /66 (BP Location: Left arm, Patient Position: Sitting, Cuff Size: Adult Regular)   Pulse 73   Temp 97.5  F (36.4  C) (Temporal)   Resp 16   Wt 81.4 kg (179 lb 8 oz)   SpO2 98%   BMI 23.23 kg/m    Body mass index is 23.23 kg/m .     Physical Exam     GENERAL: healthy, alert and no distress  RESP: lungs clear to auscultation - no rales, no rhonchi, no wheezes  CV: regular rates and rhythm, normal S1 S2, no S3 or S4 and no murmur, no click or rub   MS: extremities- no gross deformities noted, no edema, feet are warm/dry, CR<2 seconds, dp and pt pulses palpable bilaterally  SKIN: no suspicious lesions, no rashes

## 2022-02-04 LAB — TSH SERPL DL<=0.005 MIU/L-ACNC: 0.94 MU/L (ref 0.4–4)

## 2022-02-04 NOTE — RESULT ENCOUNTER NOTE
Dear Chung,     Here are your recent results which show that your thyroid test was normal.  Your vitamin D level is too low.  Please double your dose of Vitamin D (take two of the 400 unit pills) and recheck in 3-4 months.    Please let us know if you have any questions or concerns.    Regards,  Leighann Andre PA-C

## 2022-02-14 ENCOUNTER — TELEPHONE (OUTPATIENT)
Dept: FAMILY MEDICINE | Facility: CLINIC | Age: 22
End: 2022-02-14
Payer: COMMERCIAL

## 2022-02-14 DIAGNOSIS — E55.9 VITAMIN D DEFICIENCY: Primary | ICD-10-CM

## 2022-02-14 NOTE — TELEPHONE ENCOUNTER
Dr. Clement or Myra,    Pt has Vitamin D drops at home that are 4000iu each.    Pt is wondering since his D is so low,  can he take the 4000iu drops daily until rechecking labs in 3 months?      Ok to leave detailed VM with call back to [phone]  Mary Quiñones RN  [facility] RN Triage Team

## 2022-02-16 DIAGNOSIS — E55.9 VITAMIN D DEFICIENCY: ICD-10-CM

## 2022-02-16 RX ORDER — SWAB
1 SWAB, NON-MEDICATED MISCELLANEOUS DAILY
Qty: 90 CAPSULE | Refills: 3 | Status: SHIPPED | OUTPATIENT
Start: 2022-02-16 | End: 2023-03-14

## 2022-02-16 NOTE — TELEPHONE ENCOUNTER
That dose is too high, I just refilled his Rx for the dose I'd prefer he take.  I sent it to [facility] in [place] on [address].  Please let him know.  Thanks.

## 2022-02-18 NOTE — TELEPHONE ENCOUNTER
Pt requesting updated script - pended for 2 capsules (800 units) daily     Samina COUGHLIN, Triage RN  [facility]

## 2022-02-18 NOTE — TELEPHONE ENCOUNTER
He should double the dose (2 of the 400 pills to equal 800mg daily) for 3-4 months then recheck w/pcp.  Thanks.

## 2022-02-21 RX ORDER — SWAB
2 SWAB, NON-MEDICATED MISCELLANEOUS DAILY
Qty: 180 CAPSULE | Refills: 0 | Status: SHIPPED | OUTPATIENT
Start: 2022-02-21

## 2022-02-21 NOTE — TELEPHONE ENCOUNTER
Pt notified of provider response below     Samina COUGHLIN, Triage RN  [facility]

## 2022-03-22 ENCOUNTER — HOSPITAL ENCOUNTER (EMERGENCY)
Facility: CLINIC | Age: 22
Discharge: HOME | End: 2022-03-22
Attending: EMERGENCY MEDICINE | Admitting: EMERGENCY MEDICINE
Payer: COMMERCIAL

## 2022-03-22 VITALS
RESPIRATION RATE: 14 BRPM | HEART RATE: 65 BPM | OXYGEN SATURATION: 98 % | TEMPERATURE: 98 F | SYSTOLIC BLOOD PRESSURE: 125 MMHG | DIASTOLIC BLOOD PRESSURE: 74 MMHG

## 2022-03-22 DIAGNOSIS — H81.399 PERIPHERAL VERTIGO, UNSPECIFIED LATERALITY: ICD-10-CM

## 2022-03-22 PROCEDURE — 99282 EMERGENCY DEPT VISIT SF MDM: CPT

## 2022-03-22 RX ORDER — MECLIZINE HYDROCHLORIDE 25 MG/1
25 TABLET ORAL EVERY 6 HOURS PRN
Qty: 30 TABLET | Refills: 1 | Status: SHIPPED | OUTPATIENT
Start: 2022-03-22 | End: 2023-03-14

## 2022-03-22 ASSESSMENT — ENCOUNTER SYMPTOMS
TROUBLE SWALLOWING: 0
DIZZINESS: 1
VOMITING: 0
DIARRHEA: 0

## 2022-03-23 ENCOUNTER — PATIENT OUTREACH (OUTPATIENT)
Dept: FAMILY MEDICINE | Facility: CLINIC | Age: 22
End: 2022-03-23
Payer: COMMERCIAL

## 2022-03-23 NOTE — DISCHARGE INSTRUCTIONS
Please take the medication that was given you, anytime you feel dizzy as it may help with your symptoms.  Please also follow with your recommendation to go to the dizzy center in 1 week, alternatively, you can follow with your regular doctor in 1 week as well.  Come back if you still have dizziness or feel unwell even after taking the medication.  Come back with any other concerns you have.

## 2022-03-23 NOTE — ED PROVIDER NOTES
History   Chief Complaint:  Dizziness     The history is provided by the patient and medical records.      Chung Kolb is a 22 year old male with history of GERD and asthma who presents with dizziness. The patient states he had an onset of dizziness after getting out of bed yesterday morning. He denies a room-spinning sensation but did feel unsteady on his feet. He notes dizziness worsens with movement and was intermittent throughout the day yesterday. This morning he woke up with continued dizziness and gait instability. He denies chest pain, emesis, diarrhea, or difficulty swallowing.     Review of Systems   HENT: Negative for trouble swallowing.    Cardiovascular: Negative for chest pain.   Gastrointestinal: Negative for diarrhea and vomiting.   Neurological: Positive for dizziness.   All other systems reviewed and are negative.    Allergies:  The patient has no known allergies.     Medications:  Albuterol inhaler  Atarax    Past Medical History:     GERD  Asthma     Past Surgical History:    Orthopedic surgery     Family History:    Mother - Helicobacter pylori   Brother - Strabismus     Social History:  The patient was accompanied to the emergency department by his mother.    Physical Exam     Patient Vitals for the past 24 hrs:   BP Temp Temp src Pulse Resp SpO2   03/22/22 1736 125/48 -- -- 65 -- 100 %   03/22/22 1735 -- 98  F (36.7  C) Temporal -- 14 --     Physical Exam  Vitals: reviewed by me  General: Pt seen on [facility], cooperative, and alert to conversation  Eyes: Tracking well, clear conjunctiva BL  ENT: MMM, midline trachea.   Lungs: No tachypnea, no accessory muscle use. No respiratory distress.  Clear to auscultation bilaterally  CV: Rate as above, normal S1-S2, no additional heart sounds.  Abd: Soft, non tender, no guarding, no rebound. Non distended  MSK: no joint effusion.  No evidence of trauma  Skin: No rash  Neuro: Clear speech and no facial droop.  Normal  finger-to-nose, normal rapid alternating movements, normal heel-to-shin, normal independent straight and stable appearing gait.  Psych: Not RIS, no e/o AH/VH    Emergency Department Course   Emergency Department Course:     Reviewed:  I reviewed nursing notes, vitals and past medical history    Assessments:  1949 I obtained history and examined the patient as noted above.     Disposition:  The patient was discharged to home.     Impression & Plan   Medical Decision Making:  This is a very pleasant 22-year-old male presents to the emergency room with what appears to be peripheral vertigo, and likely BPPV.  He clearly tells us that his symptoms are paroxysmal, positional, and certainly describes a feeling of motion that is much more consistent with vertigo than typical lightheadedness.  Thankfully none of these symptoms have taken place during exertion, and he essentially has a negative review of systems and a normal neurologic and cardiovascular exam here.  He has no cerebellar signs here, and I do not suspect a stroke as the cause, especially given his age and lack of risk factors.  I spoke with mother about treatment, and we agreed on meclizine and follow-up with the dizzy and balance center, some and they seemed very interested in.  The patient is nearly asymptomatic right now, does endorse slight dizziness when he looks far to one side or moves his head quickly.  He is ambulatory, has no abnormal vital signs, no evidence of an infection, and I do think it is prudent to give him symptomatic management and outpatient follow-up at this point.  I discussed red flags when to come back with the mom and with the patient    Diagnosis:    ICD-10-CM    1. Peripheral vertigo, unspecified laterality  H81.399      Discharge Medications:  New Prescriptions    MECLIZINE (ANTIVERT) 25 MG TABLET    Take 1 tablet (25 mg) by mouth every 6 hours as needed for dizziness     Scribe Disclosure:  I, Danielle Patel, am serving as a scribe  at 7:41 PM on 3/22/2022 to document services personally performed by Dylon Rawls MD based on my observations and the provider's statements to me.     Dylon Rawls MD  03/22/22 2489

## 2022-03-23 NOTE — TELEPHONE ENCOUNTER
.What type of discharge? Emergency Department  Risk of Hospital admission or ED visit: 56%  Is a TCM episode required? No  When should the patient follow up with PCP? within 30 days of discharge.    Leroy Vick RN  [facility]

## 2022-05-23 ENCOUNTER — LAB (OUTPATIENT)
Dept: LAB | Facility: CLINIC | Age: 22
End: 2022-05-23
Attending: INTERNAL MEDICINE
Payer: COMMERCIAL

## 2022-05-23 DIAGNOSIS — Z00.00 ROUTINE HISTORY AND PHYSICAL EXAMINATION OF ADULT: ICD-10-CM

## 2022-05-23 LAB — HGB BLD-MCNC: 15.3 G/DL (ref 13.3–17.7)

## 2022-05-23 PROCEDURE — 82306 VITAMIN D 25 HYDROXY: CPT

## 2022-05-23 PROCEDURE — 36415 COLL VENOUS BLD VENIPUNCTURE: CPT

## 2022-05-23 PROCEDURE — 85018 HEMOGLOBIN: CPT

## 2022-05-24 LAB — DEPRECATED CALCIDIOL+CALCIFEROL SERPL-MC: 35 UG/L (ref 20–75)

## 2022-12-01 DIAGNOSIS — Z00.00 ROUTINE HISTORY AND PHYSICAL EXAMINATION OF ADULT: ICD-10-CM

## 2022-12-02 RX ORDER — HYDROXYZINE HYDROCHLORIDE 25 MG/1
TABLET, FILM COATED ORAL
Qty: 30 TABLET | Refills: 0 | Status: SHIPPED | OUTPATIENT
Start: 2022-12-02 | End: 2023-01-31

## 2022-12-02 NOTE — TELEPHONE ENCOUNTER
Routing refill request to provider for review/approval because:  Please review diagnosis for this medication.  There are no dx on the problem list, and the associated dx is routine physical.   Mary Quiñones RN

## 2022-12-26 ENCOUNTER — HEALTH MAINTENANCE LETTER (OUTPATIENT)
Age: 22
End: 2022-12-26

## 2023-01-31 ENCOUNTER — MYC REFILL (OUTPATIENT)
Dept: FAMILY MEDICINE | Facility: CLINIC | Age: 23
End: 2023-01-31
Payer: COMMERCIAL

## 2023-01-31 DIAGNOSIS — Z00.00 ROUTINE HISTORY AND PHYSICAL EXAMINATION OF ADULT: ICD-10-CM

## 2023-01-31 RX ORDER — HYDROXYZINE HYDROCHLORIDE 25 MG/1
TABLET, FILM COATED ORAL
Qty: 30 TABLET | Refills: 0 | Status: SHIPPED | OUTPATIENT
Start: 2023-01-31 | End: 2023-07-22

## 2023-03-13 NOTE — PROGRESS NOTES
SUBJECTIVE:   CC: Chung is an 23 year old who presents for preventative health visit.     New to me but not the clinic.  Finishing up college.  Computer science.  Hopes to join the work force.      Healthy Habits:     Getting at least 3 servings of Calcium per day:  Yes    Bi-annual eye exam:  NO    Dental care twice a year:  NO    Sleep apnea or symptoms of sleep apnea:  None    Diet:  Regular (no restrictions)    Frequency of exercise:  4-5 days/week    Duration of exercise:  Greater than 60 minutes    Taking medications regularly:  Not Applicable    Barriers to taking medications:  None    Medication side effects:  Not applicable    PHQ-2 Total Score: 0    Additional concerns today:  Yes        Today's PHQ-2 Score:   PHQ-2 ( 1999 [employer]) 3/14/2023   Q1: Little interest or pleasure in doing things 0   Q2: Feeling down, depressed or hopeless 0   PHQ-2 Score 0   PHQ-2 Total Score (12-17 Years)- Positive if 3 or more points; Administer PHQ-A if positive -   Q1: Little interest or pleasure in doing things Not at all   Q2: Feeling down, depressed or hopeless Not at all   PHQ-2 Score 0           Social History     Tobacco Use     Smoking status: Never     Smokeless tobacco: Never     Tobacco comments:     non smoking home   Substance Use Topics     Alcohol use: Never     Alcohol/week: 0.0 standard drinks         Alcohol Use 3/14/2023   Prescreen: >3 drinks/day or >7 drinks/week? Not Applicable   No flowsheet data found.    Last PSA: No results found for: PSA    Reviewed orders with patient. Reviewed health maintenance and updated orders accordingly - Yes  Lab work is in process    Reviewed and updated as needed this visit by clinical staff   Tobacco  Allergies  Meds      Soc Hx        Reviewed and updated as needed this visit by Provider                     Review of Systems   Constitutional: Negative for chills and fever.   HENT: Negative for congestion, ear pain, hearing loss and sore throat.    Eyes: Negative  "for pain and visual disturbance.   Respiratory: Negative for cough and shortness of breath.    Cardiovascular: Negative for chest pain, palpitations and peripheral edema.   Gastrointestinal: Negative for abdominal pain, constipation, diarrhea, heartburn, hematochezia and nausea.   Genitourinary: Negative for dysuria, frequency, genital sores, hematuria and urgency.   Musculoskeletal: Negative for arthralgias, joint swelling and myalgias.   Skin: Negative for rash.   Neurological: Negative for dizziness, weakness, headaches and paresthesias.   Psychiatric/Behavioral: Negative for mood changes. The patient is not nervous/anxious.          OBJECTIVE:   /64 (BP Location: Right arm, Patient Position: Chair, Cuff Size: Adult Large)   Pulse 73   Temp 98.1  F (36.7  C) (Temporal)   Resp 16   Ht 1.872 m (6' 1.7\")   Wt 79.4 kg (175 lb)   SpO2 97%   BMI 22.65 kg/m      Physical Exam  GENERAL: healthy, alert and no distress  EYES: Eyes grossly normal to inspection, PERRL and conjunctivae and sclerae normal  HENT: ear canals and TM's normal, nose and mouth without ulcers or lesions  NECK: no adenopathy, no asymmetry, masses, or scars and thyroid normal to palpation  RESP: lungs clear to auscultation - no rales, rhonchi or wheezes  CV: regular rate and rhythm, normal S1 S2, no S3 or S4, no murmur, click or rub, no peripheral edema and peripheral pulses strong  ABDOMEN: soft, nontender, no hepatosplenomegaly, no masses and bowel sounds normal  MS: no gross musculoskeletal defects noted, no edema  SKIN: no suspicious lesions or rashes  NEURO: Normal strength and tone, mentation intact and speech normal  PSYCH: mentation appears normal, affect normal/bright        ASSESSMENT/PLAN:       ICD-10-CM    1. Encounter for preventive care   Age and gender appropriate preventive care and screenings are discussed.  Particular attention to personal preventive care and age appropriate lifestyle including the incorporation of " healthy diet and physical activity is made.     Z00.00 Hemoglobin A1c     Vitamin D Deficiency     CBC with Platelets & Differential     Comprehensive metabolic panel     TSH with free T4 reflex     Lipid panel reflex to direct LDL Fasting     Hemoglobin A1c     Vitamin D Deficiency     CBC with Platelets & Differential     Comprehensive metabolic panel     TSH with free T4 reflex     Lipid panel reflex to direct LDL Fasting      2. Vitamin D deficiency   Low in the past.  Will check E55.9 Vitamin D Deficiency     Vitamin D Deficiency                COUNSELING:   Reviewed preventive health counseling, as reflected in patient instructions        He reports that he has never smoked. He has never used smokeless tobacco.        Tez Bernabe MD  [facility]

## 2023-03-14 ENCOUNTER — OFFICE VISIT (OUTPATIENT)
Dept: FAMILY MEDICINE | Facility: CLINIC | Age: 23
End: 2023-03-14
Payer: COMMERCIAL

## 2023-03-14 VITALS
DIASTOLIC BLOOD PRESSURE: 64 MMHG | HEIGHT: 74 IN | SYSTOLIC BLOOD PRESSURE: 111 MMHG | TEMPERATURE: 98.1 F | WEIGHT: 175 LBS | HEART RATE: 73 BPM | BODY MASS INDEX: 22.46 KG/M2 | RESPIRATION RATE: 16 BRPM | OXYGEN SATURATION: 97 %

## 2023-03-14 DIAGNOSIS — E55.9 VITAMIN D DEFICIENCY: ICD-10-CM

## 2023-03-14 DIAGNOSIS — Z00.00 ENCOUNTER FOR PREVENTIVE CARE: Primary | ICD-10-CM

## 2023-03-14 LAB
ALBUMIN SERPL BCG-MCNC: 4.6 G/DL (ref 3.5–5.2)
ALP SERPL-CCNC: 91 U/L (ref 40–129)
ALT SERPL W P-5'-P-CCNC: 21 U/L (ref 10–50)
ANION GAP SERPL CALCULATED.3IONS-SCNC: 11 MMOL/L (ref 7–15)
AST SERPL W P-5'-P-CCNC: 27 U/L (ref 10–50)
BASOPHILS # BLD AUTO: 0 10E3/UL (ref 0–0.2)
BASOPHILS NFR BLD AUTO: 0 %
BILIRUB SERPL-MCNC: 0.5 MG/DL
BUN SERPL-MCNC: 18.4 MG/DL (ref 6–20)
CALCIUM SERPL-MCNC: 9.4 MG/DL (ref 8.6–10)
CHLORIDE SERPL-SCNC: 105 MMOL/L (ref 98–107)
CHOLEST SERPL-MCNC: 123 MG/DL
CORTIS SERPL-MCNC: 7.4 UG/DL
CREAT SERPL-MCNC: 0.94 MG/DL (ref 0.67–1.17)
DEPRECATED HCO3 PLAS-SCNC: 24 MMOL/L (ref 22–29)
EOSINOPHIL # BLD AUTO: 0.1 10E3/UL (ref 0–0.7)
EOSINOPHIL NFR BLD AUTO: 3 %
ERYTHROCYTE [DISTWIDTH] IN BLOOD BY AUTOMATED COUNT: 12.3 % (ref 10–15)
GFR SERPL CREATININE-BSD FRML MDRD: >90 ML/MIN/1.73M2
GLUCOSE SERPL-MCNC: 75 MG/DL (ref 70–99)
HBA1C MFR BLD: 5.3 % (ref 0–5.6)
HCT VFR BLD AUTO: 43.7 % (ref 40–53)
HDLC SERPL-MCNC: 47 MG/DL
HGB BLD-MCNC: 14.8 G/DL (ref 13.3–17.7)
IMM GRANULOCYTES # BLD: 0 10E3/UL
IMM GRANULOCYTES NFR BLD: 0 %
LDLC SERPL CALC-MCNC: 59 MG/DL
LYMPHOCYTES # BLD AUTO: 1.6 10E3/UL (ref 0.8–5.3)
LYMPHOCYTES NFR BLD AUTO: 43 %
MCH RBC QN AUTO: 30.1 PG (ref 26.5–33)
MCHC RBC AUTO-ENTMCNC: 33.9 G/DL (ref 31.5–36.5)
MCV RBC AUTO: 89 FL (ref 78–100)
MONOCYTES # BLD AUTO: 0.5 10E3/UL (ref 0–1.3)
MONOCYTES NFR BLD AUTO: 15 %
NEUTROPHILS # BLD AUTO: 1.4 10E3/UL (ref 1.6–8.3)
NEUTROPHILS NFR BLD AUTO: 39 %
NONHDLC SERPL-MCNC: 76 MG/DL
PLATELET # BLD AUTO: 220 10E3/UL (ref 150–450)
POTASSIUM SERPL-SCNC: 4.1 MMOL/L (ref 3.4–5.3)
PROT SERPL-MCNC: 7.4 G/DL (ref 6.4–8.3)
RBC # BLD AUTO: 4.91 10E6/UL (ref 4.4–5.9)
SODIUM SERPL-SCNC: 140 MMOL/L (ref 136–145)
TRIGL SERPL-MCNC: 85 MG/DL
TSH SERPL DL<=0.005 MIU/L-ACNC: 1.34 UIU/ML (ref 0.3–4.2)
WBC # BLD AUTO: 3.7 10E3/UL (ref 4–11)

## 2023-03-14 PROCEDURE — 82533 TOTAL CORTISOL: CPT | Performed by: INTERNAL MEDICINE

## 2023-03-14 PROCEDURE — 99395 PREV VISIT EST AGE 18-39: CPT | Mod: 25 | Performed by: INTERNAL MEDICINE

## 2023-03-14 PROCEDURE — 36415 COLL VENOUS BLD VENIPUNCTURE: CPT | Performed by: INTERNAL MEDICINE

## 2023-03-14 PROCEDURE — 83036 HEMOGLOBIN GLYCOSYLATED A1C: CPT | Performed by: INTERNAL MEDICINE

## 2023-03-14 PROCEDURE — 80050 GENERAL HEALTH PANEL: CPT | Performed by: INTERNAL MEDICINE

## 2023-03-14 PROCEDURE — 82306 VITAMIN D 25 HYDROXY: CPT | Performed by: INTERNAL MEDICINE

## 2023-03-14 PROCEDURE — 80061 LIPID PANEL: CPT | Performed by: INTERNAL MEDICINE

## 2023-03-14 ASSESSMENT — ENCOUNTER SYMPTOMS
ABDOMINAL PAIN: 0
JOINT SWELLING: 0
DYSURIA: 0
HEARTBURN: 0
PALPITATIONS: 0
DIZZINESS: 0
CONSTIPATION: 0
SHORTNESS OF BREATH: 0
NERVOUS/ANXIOUS: 0
SORE THROAT: 0
DIARRHEA: 0
FREQUENCY: 0
NAUSEA: 0
MYALGIAS: 0
HEADACHES: 0
CHILLS: 0
HEMATOCHEZIA: 0
PARESTHESIAS: 0
COUGH: 0
HEMATURIA: 0
WEAKNESS: 0
EYE PAIN: 0
FEVER: 0
ARTHRALGIAS: 0

## 2023-03-14 ASSESSMENT — ASTHMA QUESTIONNAIRES
ACT_TOTALSCORE: 25
QUESTION_5 LAST FOUR WEEKS HOW WOULD YOU RATE YOUR ASTHMA CONTROL: COMPLETELY CONTROLLED
QUESTION_4 LAST FOUR WEEKS HOW OFTEN HAVE YOU USED YOUR RESCUE INHALER OR NEBULIZER MEDICATION (SUCH AS ALBUTEROL): NOT AT ALL
QUESTION_2 LAST FOUR WEEKS HOW OFTEN HAVE YOU HAD SHORTNESS OF BREATH: NOT AT ALL
QUESTION_1 LAST FOUR WEEKS HOW MUCH OF THE TIME DID YOUR ASTHMA KEEP YOU FROM GETTING AS MUCH DONE AT WORK, SCHOOL OR AT HOME: NONE OF THE TIME
ACT_TOTALSCORE: 25
QUESTION_3 LAST FOUR WEEKS HOW OFTEN DID YOUR ASTHMA SYMPTOMS (WHEEZING, COUGHING, SHORTNESS OF BREATH, CHEST TIGHTNESS OR PAIN) WAKE YOU UP AT NIGHT OR EARLIER THAN USUAL IN THE MORNING: NOT AT ALL

## 2023-03-14 ASSESSMENT — PAIN SCALES - GENERAL: PAINLEVEL: NO PAIN (0)

## 2023-03-15 NOTE — RESULT ENCOUNTER NOTE
"Hi, Chung!    Your results are in.  Everything looks good.  There are some values that may be slightly out of the \"normal\" range, but are not worrisome.  Vitamin D level is still in process and will be resulted separately.      Let me know if you have any questions.  If there's a particular blood test result you're concerned about please schedule a follow up appointment at your convenience.        Tez Bernabe MD on 3/14/2023 at 8:25 PM    "

## 2023-03-16 LAB — DEPRECATED CALCIDIOL+CALCIFEROL SERPL-MC: 36 UG/L (ref 20–75)

## 2023-03-16 NOTE — RESULT ENCOUNTER NOTE
This is Dylon Fulton and I am a PA who is covering for Dr. Bernabe who is currently out of the office. I had a chance to review your recent result - Vitamin D looks perfect.   Feel free to contact us with additional concerns.   Dylon Fulton PA-C on 3/16/2023 at 2:30 PM

## 2023-07-17 ENCOUNTER — ANCILLARY PROCEDURE (OUTPATIENT)
Dept: GENERAL RADIOLOGY | Facility: CLINIC | Age: 23
End: 2023-07-17
Attending: FAMILY MEDICINE
Payer: COMMERCIAL

## 2023-07-17 ENCOUNTER — OFFICE VISIT (OUTPATIENT)
Dept: URGENT CARE | Facility: URGENT CARE | Age: 23
End: 2023-07-17
Payer: COMMERCIAL

## 2023-07-17 VITALS
SYSTOLIC BLOOD PRESSURE: 122 MMHG | RESPIRATION RATE: 16 BRPM | HEART RATE: 72 BPM | OXYGEN SATURATION: 99 % | TEMPERATURE: 97.7 F | DIASTOLIC BLOOD PRESSURE: 68 MMHG | BODY MASS INDEX: 23.17 KG/M2 | WEIGHT: 179 LBS

## 2023-07-17 DIAGNOSIS — R06.2 WHEEZE: ICD-10-CM

## 2023-07-17 DIAGNOSIS — R05.9 COUGH, UNSPECIFIED TYPE: Primary | ICD-10-CM

## 2023-07-17 PROCEDURE — 99214 OFFICE O/P EST MOD 30 MIN: CPT | Performed by: FAMILY MEDICINE

## 2023-07-17 PROCEDURE — 71046 X-RAY EXAM CHEST 2 VIEWS: CPT | Mod: TC | Performed by: RADIOLOGY

## 2023-07-17 RX ORDER — ALBUTEROL SULFATE 90 UG/1
2 AEROSOL, METERED RESPIRATORY (INHALATION) EVERY 6 HOURS
Qty: 18 G | Refills: 0 | Status: SHIPPED | OUTPATIENT
Start: 2023-07-17 | End: 2023-08-16

## 2023-07-17 RX ORDER — PREDNISONE 20 MG/1
20 TABLET ORAL 2 TIMES DAILY
Qty: 10 TABLET | Refills: 0 | Status: SHIPPED | OUTPATIENT
Start: 2023-07-17 | End: 2023-07-22

## 2023-07-18 NOTE — PROGRESS NOTES
SUBJECTIVE: Chung Kolb is a 23 year old male presenting with a chief complaint of cough  and sob.  Onset of symptoms was day(s) ago.  Current and Associated symptoms: cough - non-productive  Predisposing factors include None.    Past Medical History:   Diagnosis Date     GERD (gastroesophageal reflux disease)      Uncomplicated asthma 8-2020     Allergies   Allergen Reactions     Drug Class [No Known Allergies]      Social History     Tobacco Use     Smoking status: Never     Smokeless tobacco: Never     Tobacco comments:     non smoking home   Substance Use Topics     Alcohol use: Never     Alcohol/week: 0.0 standard drinks of alcohol       ROS:  SKIN: no rash  GI: no vomiting    OBJECTIVE:  /68   Pulse 72   Temp 97.7  F (36.5  C) (Tympanic)   Resp 16   Wt 81.2 kg (179 lb)   SpO2 99%   BMI 23.17 kg/m  GENERAL APPEARANCE: healthy, alert and no distress  EYES: EOMI,  PERRL, conjunctiva clear  HENT: ear canals and TM's normal.  Nose and mouth without ulcers, erythema or lesions  RESP: expiratory wheezes throughout  SKIN: no suspicious lesions or rashes    Xray without acute findings, no infiltrate read by Jelani García D.O.      ICD-10-CM    1. Cough, unspecified type  R05.9 XR Chest 2 Views     predniSONE (DELTASONE) 20 MG tablet     albuterol (PROAIR HFA) 108 (90 Base) MCG/ACT inhaler      2. Wheeze  R06.2 predniSONE (DELTASONE) 20 MG tablet     albuterol (PROAIR HFA) 108 (90 Base) MCG/ACT inhaler        Fluids/Rest, f/u if worse/not any better

## 2023-07-22 ENCOUNTER — MYC REFILL (OUTPATIENT)
Dept: FAMILY MEDICINE | Facility: CLINIC | Age: 23
End: 2023-07-22
Payer: COMMERCIAL

## 2023-07-22 DIAGNOSIS — Z00.00 ROUTINE HISTORY AND PHYSICAL EXAMINATION OF ADULT: ICD-10-CM

## 2023-07-24 RX ORDER — HYDROXYZINE HYDROCHLORIDE 25 MG/1
TABLET, FILM COATED ORAL
Qty: 30 TABLET | Refills: 0 | Status: SHIPPED | OUTPATIENT
Start: 2023-07-24

## 2024-04-14 ENCOUNTER — HEALTH MAINTENANCE LETTER (OUTPATIENT)
Age: 24
End: 2024-04-14

## 2025-04-19 ENCOUNTER — HEALTH MAINTENANCE LETTER (OUTPATIENT)
Age: 25
End: 2025-04-19